# Patient Record
Sex: FEMALE | Race: WHITE | Employment: OTHER | URBAN - METROPOLITAN AREA
[De-identification: names, ages, dates, MRNs, and addresses within clinical notes are randomized per-mention and may not be internally consistent; named-entity substitution may affect disease eponyms.]

---

## 2017-07-25 PROBLEM — H40.001 GLAUCOMA SUSPECT, RIGHT: Status: ACTIVE | Noted: 2017-07-25

## 2018-12-12 ENCOUNTER — APPOINTMENT (OUTPATIENT)
Dept: CT IMAGING | Facility: HOSPITAL | Age: 63
End: 2018-12-12
Attending: EMERGENCY MEDICINE
Payer: COMMERCIAL

## 2018-12-12 ENCOUNTER — HOSPITAL ENCOUNTER (EMERGENCY)
Facility: HOSPITAL | Age: 63
Discharge: HOME | End: 2018-12-12
Attending: EMERGENCY MEDICINE
Payer: COMMERCIAL

## 2018-12-12 VITALS
TEMPERATURE: 98 F | RESPIRATION RATE: 16 BRPM | HEART RATE: 75 BPM | SYSTOLIC BLOOD PRESSURE: 148 MMHG | OXYGEN SATURATION: 95 % | DIASTOLIC BLOOD PRESSURE: 73 MMHG | WEIGHT: 161 LBS | HEIGHT: 63 IN | BODY MASS INDEX: 28.53 KG/M2

## 2018-12-12 DIAGNOSIS — R29.818 TRANSIENT NEUROLOGICAL SYMPTOMS: Primary | ICD-10-CM

## 2018-12-12 PROCEDURE — 93010 ELECTROCARDIOGRAM REPORT: CPT

## 2018-12-12 PROCEDURE — 93005 ELECTROCARDIOGRAM TRACING: CPT

## 2018-12-12 PROCEDURE — 99285 EMERGENCY DEPT VISIT HI MDM: CPT

## 2018-12-12 PROCEDURE — 36415 COLL VENOUS BLD VENIPUNCTURE: CPT

## 2018-12-12 PROCEDURE — 85610 PROTHROMBIN TIME: CPT | Performed by: EMERGENCY MEDICINE

## 2018-12-12 PROCEDURE — 70450 CT HEAD/BRAIN W/O DYE: CPT | Performed by: EMERGENCY MEDICINE

## 2018-12-12 PROCEDURE — 85730 THROMBOPLASTIN TIME PARTIAL: CPT | Performed by: EMERGENCY MEDICINE

## 2018-12-12 PROCEDURE — 85025 COMPLETE CBC W/AUTO DIFF WBC: CPT | Performed by: EMERGENCY MEDICINE

## 2018-12-12 PROCEDURE — 80053 COMPREHEN METABOLIC PANEL: CPT | Performed by: EMERGENCY MEDICINE

## 2018-12-12 NOTE — ED PROVIDER NOTES
Patient Seen in: [facility] Emergency Department    History   Patient presents with:  Altered Mental Status (neurologic)    Stated Complaint: ams    HPI    59-year-old female, medical history as noted below, here for evaluation of symptoms that have Alcohol/week: 0.0 oz      Comment: SS    Drug use: No      Review of Systems    Positive for stated complaint: ams  Other systems are as noted in HPI. Constitutional and vital signs reviewed.       All other systems reviewed and negative except as noted ab affect.  Thought content normal.       ED Course     Labs Reviewed   COMP METABOLIC PANEL (14) - Abnormal; Notable for the following components:       Result Value    Glucose 107 (*)     Sodium 132 (*)     CO2 20.0 (*)     A/G Ratio 0.9 (*)     All other co represent a TIA or acute stroke. Given the pattern, occurring intermittently in clusters, for several years, lasting only a few minutes, ischemic events are unlikely. Seizures are a possibility.   Chance that these could be convex migraines, episodes c

## 2018-12-12 NOTE — ED NOTES
Pt states I am having \"one right now\" pt started breathing quickly and repeated is on the Left side.  Pt states she was having pins and needle feeling to her fingertips

## 2018-12-12 NOTE — ED INITIAL ASSESSMENT (HPI)
states when he talked to pt 10 she was \"not making sense\". Pt states around 0500 she woke up feeling disorientated. Pt states lasting for 1-2 minutes pt has feelings where she is having an \"outter body experience\".

## 2019-01-28 ENCOUNTER — OFFICE VISIT (OUTPATIENT)
Dept: NEUROLOGY | Facility: CLINIC | Age: 64
End: 2019-01-28
Payer: COMMERCIAL

## 2019-01-28 VITALS
HEART RATE: 78 BPM | BODY MASS INDEX: 28.71 KG/M2 | RESPIRATION RATE: 16 BRPM | WEIGHT: 160 LBS | SYSTOLIC BLOOD PRESSURE: 130 MMHG | HEIGHT: 62.5 IN | DIASTOLIC BLOOD PRESSURE: 71 MMHG

## 2019-01-28 DIAGNOSIS — R29.2 BRISK DEEP TENDON REFLEXES: ICD-10-CM

## 2019-01-28 DIAGNOSIS — R20.2 NUMBNESS AND TINGLING OF LEFT ARM AND LEG: Primary | ICD-10-CM

## 2019-01-28 DIAGNOSIS — E78.5 HYPERLIPIDEMIA, UNSPECIFIED HYPERLIPIDEMIA TYPE: ICD-10-CM

## 2019-01-28 DIAGNOSIS — M54.2 NECK PAIN: ICD-10-CM

## 2019-01-28 DIAGNOSIS — R20.0 NUMBNESS AND TINGLING OF LEFT ARM AND LEG: Primary | ICD-10-CM

## 2019-01-28 DIAGNOSIS — R29.818 TRANSIENT NEUROLOGICAL SYMPTOMS: ICD-10-CM

## 2019-01-28 PROCEDURE — 99204 OFFICE O/P NEW MOD 45 MIN: CPT | Performed by: OTHER

## 2019-01-28 PROCEDURE — 1111F DSCHRG MED/CURRENT MED MERGE: CPT | Performed by: OTHER

## 2019-01-28 RX ORDER — MONTELUKAST SODIUM 10 MG/1
10 TABLET ORAL AS NEEDED
COMMUNITY
End: 2020-09-29

## 2019-01-28 NOTE — PATIENT INSTRUCTIONS
Refill policies:    • Allow 2-3 business days for refills; controlled substances may take longer.   • Contact your pharmacy at least 5 days prior to running out of medication and have them send an electronic request or submit request through the “request re been approved by your insurer. Depending on your insurance carrier, approval may take 3-10 days. It is highly recommended patients contact their insurance carrier directly to determine coverage.   If test is done without insurance authorization, patient ma ask the  for this paper. • Failure to follow above steps may result in the delay of form completion. EEG INSTRUCTIONS:    TESTING:    · You will have 23 electrodes placed on your head for the EEG and 2 on your chest for an EKG tracing.

## 2019-01-28 NOTE — H&P
[facility] New Patient / Consult Visit    Georgiann Claude is a 59year old female. Referring MD: No ref.  provider found    Patient presents with:  Hospital F/U: TIA      HPI:    Georgiann Claude is a 59year old, diverticulitis of colon    • Hyperlipidemia      Past Surgical History:   Procedure Laterality Date   •      • OTHER SURGICAL HISTORY      ankle surgery for right ankle fracture   • OTHER SURGICAL HISTORY      left ganglion cyst removal   • OTHER SURGI Wt 160 lb   BMI 28.80 kg/m²   Estimated body mass index is 28.8 kg/m² as calculated from the following:    Height as of this encounter: 62.5\". Weight as of this encounter: 160 lb.     GENERAL: well developed, well nourished, in no apparent distress  S though somewhat limited to R ankle movement; able to tandem but falls out quickly     TEST RESULTS/DATA REVIEWED:     Imaging:     CT brain (12/12/18): FINDINGS:  Brain volume is normal.  No bleed or mass effect.   Basal cisterns are normal.  There is n AST (SGOT)      15 - 41 U/L 15   ALT (SGPT)      14 - 54 U/L 19   ALKALINE PHOSPHATASE      50 - 130 U/L 109   Total Bilirubin      0.1 - 2.0 mg/dL 0.4   TOTAL PROTEIN      6.4 - 8.2 g/dL 7.9   Albumin      3.1 - 4.5 g/dL 3.8   GLOBULIN, TOTAL      2.8 - PCP         As noted above     (R29.2) Brisk deep tendon reflexes  Plan: MRI BRAIN MRA HEAD+MRA NECK (ALL W+WO)         (CPT=70553/80992/44521), MRI SPINE CERVICAL         (CPT=72141), OP REFERRAL TO PCP , VITAMIN B12        As noted above     (R29.818) Tr

## 2019-02-05 ENCOUNTER — NURSE ONLY (OUTPATIENT)
Dept: NEUROLOGY | Facility: CLINIC | Age: 64
End: 2019-02-05
Payer: COMMERCIAL

## 2019-02-05 ENCOUNTER — LAB ENCOUNTER (OUTPATIENT)
Dept: LAB | Facility: HOSPITAL | Age: 64
End: 2019-02-05
Attending: Other
Payer: COMMERCIAL

## 2019-02-05 DIAGNOSIS — R29.2 BRISK DEEP TENDON REFLEXES: ICD-10-CM

## 2019-02-05 DIAGNOSIS — R29.818 TRANSIENT NEUROLOGICAL SYMPTOMS: ICD-10-CM

## 2019-02-05 DIAGNOSIS — Z91.041 ALLERGY TO INTRAVENOUS CONTRAST MEDIA: Primary | ICD-10-CM

## 2019-02-05 DIAGNOSIS — R20.0 NUMBNESS AND TINGLING OF LEFT ARM AND LEG: ICD-10-CM

## 2019-02-05 DIAGNOSIS — R20.2 NUMBNESS AND TINGLING OF LEFT ARM AND LEG: ICD-10-CM

## 2019-02-05 LAB
ALBUMIN SERPL-MCNC: 3.5 G/DL (ref 3.1–4.5)
ALBUMIN/GLOB SERPL: 0.9 {RATIO} (ref 1–2)
ALP LIVER SERPL-CCNC: 94 U/L (ref 50–130)
ALT SERPL-CCNC: 16 U/L (ref 14–54)
ANION GAP SERPL CALC-SCNC: 5 MMOL/L (ref 0–18)
AST SERPL-CCNC: 13 U/L (ref 15–41)
BASOPHILS # BLD AUTO: 0.07 X10(3) UL (ref 0–0.2)
BASOPHILS NFR BLD AUTO: 0.9 %
BILIRUB SERPL-MCNC: 0.3 MG/DL (ref 0.1–2)
BUN BLD-MCNC: 20 MG/DL (ref 8–20)
BUN/CREAT SERPL: 25.6 (ref 10–20)
CALCIUM BLD-MCNC: 8.9 MG/DL (ref 8.3–10.3)
CHLORIDE SERPL-SCNC: 110 MMOL/L (ref 101–111)
CO2 SERPL-SCNC: 26 MMOL/L (ref 22–32)
CREAT BLD-MCNC: 0.78 MG/DL (ref 0.55–1.02)
DEPRECATED RDW RBC AUTO: 43.1 FL (ref 35.1–46.3)
EOSINOPHIL # BLD AUTO: 0.5 X10(3) UL (ref 0–0.7)
EOSINOPHIL NFR BLD AUTO: 6.6 %
ERYTHROCYTE [DISTWIDTH] IN BLOOD BY AUTOMATED COUNT: 13.5 % (ref 11–15)
GLOBULIN PLAS-MCNC: 3.8 G/DL (ref 2.8–4.4)
GLUCOSE BLD-MCNC: 85 MG/DL (ref 70–99)
HAV AB SERPL IA-ACNC: 674 PG/ML (ref 193–986)
HCT VFR BLD AUTO: 44.1 % (ref 35–48)
HGB BLD-MCNC: 14.4 G/DL (ref 12–16)
IMM GRANULOCYTES # BLD AUTO: 0.02 X10(3) UL (ref 0–1)
IMM GRANULOCYTES NFR BLD: 0.3 %
LYMPHOCYTES # BLD AUTO: 2.42 X10(3) UL (ref 1–4)
LYMPHOCYTES NFR BLD AUTO: 31.9 %
M PROTEIN MFR SERPL ELPH: 7.3 G/DL (ref 6.4–8.2)
MCH RBC QN AUTO: 28.3 PG (ref 26–34)
MCHC RBC AUTO-ENTMCNC: 32.7 G/DL (ref 31–37)
MCV RBC AUTO: 86.6 FL (ref 80–100)
MONOCYTES # BLD AUTO: 0.73 X10(3) UL (ref 0.1–1)
MONOCYTES NFR BLD AUTO: 9.6 %
NEUTROPHILS # BLD AUTO: 3.84 X10 (3) UL (ref 1.5–7.7)
NEUTROPHILS # BLD AUTO: 3.84 X10(3) UL (ref 1.5–7.7)
NEUTROPHILS NFR BLD AUTO: 50.7 %
OSMOLALITY SERPL CALC.SUM OF ELEC: 294 MOSM/KG (ref 275–295)
PLATELET # BLD AUTO: 315 10(3)UL (ref 150–450)
POTASSIUM SERPL-SCNC: 4.3 MMOL/L (ref 3.6–5.1)
RBC # BLD AUTO: 5.09 X10(6)UL (ref 3.8–5.3)
SODIUM SERPL-SCNC: 141 MMOL/L (ref 136–144)
WBC # BLD AUTO: 7.6 X10(3) UL (ref 4–11)

## 2019-02-05 PROCEDURE — 36415 COLL VENOUS BLD VENIPUNCTURE: CPT

## 2019-02-05 PROCEDURE — 80053 COMPREHEN METABOLIC PANEL: CPT

## 2019-02-05 PROCEDURE — 95816 EEG AWAKE AND DROWSY: CPT | Performed by: OTHER

## 2019-02-05 PROCEDURE — 85025 COMPLETE CBC W/AUTO DIFF WBC: CPT

## 2019-02-05 PROCEDURE — 82607 VITAMIN B-12: CPT

## 2019-02-05 NOTE — IMAGING NOTE
Will premed per MD preference for allergies per protocol. Pt given instructions re allergies for prednisone and benadryl with times and dosages. Verbalized understanding of all instructions.

## 2019-02-06 RX ORDER — PREDNISONE 10 MG/1
TABLET ORAL
Qty: 15 TABLET | Refills: 0 | Status: SHIPPED | OUTPATIENT
Start: 2019-02-06 | End: 2020-07-23 | Stop reason: ALTCHOICE

## 2019-02-11 ENCOUNTER — HOSPITAL ENCOUNTER (OUTPATIENT)
Dept: MRI IMAGING | Facility: HOSPITAL | Age: 64
Discharge: HOME | End: 2019-02-11
Attending: Other
Payer: COMMERCIAL

## 2019-02-11 DIAGNOSIS — R29.2 BRISK DEEP TENDON REFLEXES: ICD-10-CM

## 2019-02-11 DIAGNOSIS — E78.5 HYPERLIPIDEMIA, UNSPECIFIED HYPERLIPIDEMIA TYPE: ICD-10-CM

## 2019-02-11 DIAGNOSIS — R20.0 NUMBNESS AND TINGLING OF LEFT ARM AND LEG: ICD-10-CM

## 2019-02-11 DIAGNOSIS — M54.2 NECK PAIN: ICD-10-CM

## 2019-02-11 DIAGNOSIS — R29.818 TRANSIENT NEUROLOGICAL SYMPTOMS: ICD-10-CM

## 2019-02-11 DIAGNOSIS — R20.2 NUMBNESS AND TINGLING OF LEFT ARM AND LEG: ICD-10-CM

## 2019-02-11 PROCEDURE — 70553 MRI BRAIN STEM W/O & W/DYE: CPT | Performed by: OTHER

## 2019-02-11 PROCEDURE — 70546 MR ANGIOGRAPH HEAD W/O&W/DYE: CPT | Performed by: OTHER

## 2019-02-11 PROCEDURE — 72141 MRI NECK SPINE W/O DYE: CPT | Performed by: OTHER

## 2019-02-11 PROCEDURE — A9575 INJ GADOTERATE MEGLUMI 0.1ML: HCPCS

## 2019-02-11 PROCEDURE — 70549 MR ANGIOGRAPH NECK W/O&W/DYE: CPT | Performed by: OTHER

## 2019-02-11 NOTE — PROCEDURES
[address] [facility] in [place]  in affiliation with [facility]  [address]  [address]  [phone]  Fax [phone]      Name: Mounika Daigle  2/20/1955  Date of Dionicio None    Seizures: none    Events: none    Impression: This EEG is normal.  No epileptiform activity, abnormal slowing or clinical or electrographic seizures were noted on this awake and drowsy recording.      Jen Gomez MD, Neurology  [facility]

## 2019-02-13 ENCOUNTER — TELEPHONE (OUTPATIENT)
Dept: NEUROLOGY | Facility: CLINIC | Age: 64
End: 2019-02-13

## 2019-02-13 NOTE — TELEPHONE ENCOUNTER
----- Message from Richard Rondon MD sent at 2/11/2019 10:08 AM CST -----  Results noted, EEG normal; let patient know

## 2019-02-14 ENCOUNTER — TELEPHONE (OUTPATIENT)
Dept: NEUROLOGY | Facility: CLINIC | Age: 64
End: 2019-02-14

## 2019-02-14 NOTE — TELEPHONE ENCOUNTER
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

## 2020-12-24 PROBLEM — D12.6 ADENOMATOUS POLYP OF COLON, UNSPECIFIED PART OF COLON: Status: ACTIVE | Noted: 2020-12-24

## 2020-12-24 PROBLEM — K57.90 DIVERTICULOSIS: Status: ACTIVE | Noted: 2020-12-24

## 2020-12-24 PROBLEM — R32 URINARY INCONTINENCE, UNSPECIFIED TYPE: Status: ACTIVE | Noted: 2020-12-24

## 2020-12-24 PROBLEM — L50.8 CHRONIC URTICARIA: Status: ACTIVE | Noted: 2020-12-24

## 2020-12-24 PROBLEM — F33.8 SEASONAL DEPRESSION (HCC): Status: ACTIVE | Noted: 2020-12-24

## 2021-01-07 PROBLEM — R10.813 RIGHT LOWER QUADRANT ABDOMINAL TENDERNESS WITHOUT REBOUND TENDERNESS: Status: ACTIVE | Noted: 2021-01-07

## 2021-01-07 PROBLEM — K59.00 CONSTIPATION, UNSPECIFIED CONSTIPATION TYPE: Status: ACTIVE | Noted: 2021-01-07

## 2021-01-07 PROBLEM — Z80.0 FAMILY HISTORY OF COLON CANCER: Status: ACTIVE | Noted: 2021-01-07

## 2021-01-07 PROBLEM — E73.9 LACTOSE INTOLERANCE: Status: ACTIVE | Noted: 2021-01-07

## 2021-01-07 PROBLEM — Z87.19 HISTORY OF DIVERTICULITIS: Status: ACTIVE | Noted: 2021-01-07

## 2021-01-07 PROBLEM — Z86.010 HISTORY OF ADENOMATOUS POLYP OF COLON: Status: ACTIVE | Noted: 2021-01-07

## 2021-01-27 ENCOUNTER — TELEPHONE (OUTPATIENT)
Dept: CT IMAGING | Age: 66
End: 2021-01-27

## 2021-01-27 DIAGNOSIS — Z11.52 ENCOUNTER FOR PREPROCEDURE SCREENING LABORATORY TESTING FOR COVID-19: ICD-10-CM

## 2021-01-27 DIAGNOSIS — Z01.812 PRE-PROCEDURAL LABORATORY EXAMINATION: Primary | ICD-10-CM

## 2021-01-27 DIAGNOSIS — Z01.812 ENCOUNTER FOR PREPROCEDURE SCREENING LABORATORY TESTING FOR COVID-19: ICD-10-CM

## 2021-01-28 ENCOUNTER — HOSPITAL ENCOUNTER (OUTPATIENT)
Dept: LAB | Age: 66
Discharge: HOME | End: 2021-01-28
Attending: INTERNAL MEDICINE

## 2021-01-28 ENCOUNTER — HOSPITAL ENCOUNTER (OUTPATIENT)
Dept: CT IMAGING | Age: 66
Discharge: HOME | End: 2021-01-28
Attending: INTERNAL MEDICINE

## 2021-01-28 DIAGNOSIS — Z87.19 PERSONAL HISTORY OF UNSPECIFIED DIGESTIVE DISEASE: ICD-10-CM

## 2021-01-28 DIAGNOSIS — K57.90 DIVERTICULOSIS: ICD-10-CM

## 2021-01-28 DIAGNOSIS — Z01.812 PRE-PROCEDURAL LABORATORY EXAMINATION: ICD-10-CM

## 2021-01-28 LAB
CREAT SERPL-MCNC: 0.69 MG/DL (ref 0.51–0.95)
GFR SERPLBLD BASED ON 1.73 SQ M-ARVRAT: >90 ML/MIN/1.73M2

## 2021-01-28 PROCEDURE — 82565 ASSAY OF CREATININE: CPT | Performed by: INTERNAL MEDICINE

## 2021-01-28 PROCEDURE — 74177 CT ABD & PELVIS W/CONTRAST: CPT

## 2021-01-28 PROCEDURE — 36415 COLL VENOUS BLD VENIPUNCTURE: CPT | Performed by: INTERNAL MEDICINE

## 2021-01-28 PROCEDURE — 10002805 HB CONTRAST AGENT: Performed by: INTERNAL MEDICINE

## 2021-01-28 RX ADMIN — IOHEXOL 75 ML: 350 INJECTION, SOLUTION INTRAVENOUS at 17:00

## 2021-02-04 PROBLEM — C48.1 EXTRAOVARIAN PRIMARY PERITONEAL CARCINOMA (HCC): Status: ACTIVE | Noted: 2021-02-04

## 2021-03-03 DIAGNOSIS — Z23 NEED FOR VACCINATION: ICD-10-CM

## 2021-03-15 DIAGNOSIS — Z23 NEED FOR VACCINATION: ICD-10-CM

## 2021-08-10 ENCOUNTER — TELEPHONE (OUTPATIENT)
Dept: NEUROLOGY | Facility: CLINIC | Age: 66
End: 2021-08-10

## 2021-08-10 NOTE — TELEPHONE ENCOUNTER
Rec'd incoming STAT request fax from [facility] requesting EEG report and OV notes from 2019 with Dr. Adalberto Guerin. Pt currently in office there. Faxed EEG from 2/5/19 and OV notes from 1/28/19 with fax confirmation rec'd.

## 2021-10-15 ENCOUNTER — APPOINTMENT (OUTPATIENT)
Dept: MRI IMAGING | Age: 66
End: 2021-10-15
Attending: PSYCHIATRY & NEUROLOGY

## 2021-10-18 ENCOUNTER — HOSPITAL ENCOUNTER (OUTPATIENT)
Dept: MRI IMAGING | Age: 66
Discharge: HOME | End: 2021-10-18
Attending: PSYCHIATRY & NEUROLOGY

## 2021-10-18 DIAGNOSIS — R29.818 TRANSIENT NEUROLOGIC DEFICIT: ICD-10-CM

## 2021-10-18 PROCEDURE — 70551 MRI BRAIN STEM W/O DYE: CPT

## (undated) NOTE — ED AVS SNAPSHOT
Marlinezbigniew Yeh   MRN: [id number]    Department:  [facility] Emergency Department   Date of Visit:  12/12/2018           Disclosure     Insurance plans vary and the physician(s) referred by the ER may not be covered by your plan.  Please contact yo tell this physician (or your personal doctor if your instructions are to return to your personal doctor) about any new or lasting problems. The primary care or specialist physician will see patients referred from the [facility] Emergency Department.  Tom Catalan